# Patient Record
(demographics unavailable — no encounter records)

---

## 2024-10-30 NOTE — HISTORY OF PRESENT ILLNESS
[FreeTextEntry1] : Patient is 82 years old para 4-0-0-4 last menstrual period 1998. She denies postmenopausal bleeding and is present without complaints. Patient has a history of lichen sclerosis and notes improvement with continued clobetasol ointment treatment.

## 2024-10-30 NOTE — PHYSICAL EXAM
[Chaperone Present] : A chaperone was present in the examining room during all aspects of the physical examination [FreeTextEntry2] : FLORINDA [Appropriately responsive] : appropriately responsive [Alert] : alert [No Acute Distress] : no acute distress [No Lymphadenopathy] : no lymphadenopathy [Regular Rate Rhythm] : regular rate rhythm [No Murmurs] : no murmurs [Clear to Auscultation B/L] : clear to auscultation bilaterally [Soft] : soft [Non-tender] : non-tender [Non-distended] : non-distended [No HSM] : No HSM [No Lesions] : no lesions [No Mass] : no mass [Oriented x3] : oriented x3 [Examination Of The Breasts] : a normal appearance [No Masses] : no breast masses were palpable [Labia Majora] : normal [Labia Minora] : normal [Atrophy] : atrophy [Normal] : normal [Uterine Adnexae] : normal [FreeTextEntry1] : Areas of lichen sclerosis improved/stable

## 2025-05-22 NOTE — HISTORY OF PRESENT ILLNESS
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  Tripped and fell over the vacuum .  Shoulder replacement surgery on 1/13/2023.  Father, 64, MI/SCD Mother, MVR Twin sister, 48, SCD Brothers, 60, CHF , Stage 3 pancreatic cancer  5/2025:  No CP, SOB or syncope.  METs >4.  ECHO today reviewed:  AV MG 16, FÁTIMA 0.96  Xenograft Text: The defect edges were debeveled with a #15 scalpel blade.  Given the location of the defect, shape of the defect and the proximity to free margins a xenograft was deemed most appropriate.  The graft was then trimmed to fit the size of the defect.  The graft was then placed in the primary defect and oriented appropriately.

## 2025-05-22 NOTE — DISCUSSION/SUMMARY
[FreeTextEntry1] : Mod-Severe LFLG AS HTN   ECHO to evaluate AS every 6 months Continue Atenolol 25 mg daily Continue Losartan 100 mg daily ECHO and follow-up visit same day

## 2025-05-22 NOTE — PHYSICAL EXAM
[Well Developed] : well developed [No Acute Distress] : no acute distress [Normal Conjunctiva] : normal conjunctiva [No Carotid Bruit] : no carotid bruit [Clear Lung Fields] : clear lung fields [Good Air Entry] : good air entry [No Respiratory Distress] : no respiratory distress  [Soft] : abdomen soft [Non Tender] : non-tender [No Rash] : no rash [Moves all extremities] : moves all extremities [No Focal Deficits] : no focal deficits [Alert and Oriented] : alert and oriented [de-identified] : RRR, MATEO, no LE edema